# Patient Record
Sex: FEMALE | Race: WHITE | NOT HISPANIC OR LATINO | Employment: OTHER | URBAN - METROPOLITAN AREA
[De-identification: names, ages, dates, MRNs, and addresses within clinical notes are randomized per-mention and may not be internally consistent; named-entity substitution may affect disease eponyms.]

---

## 2023-12-29 ENCOUNTER — OFFICE VISIT (OUTPATIENT)
Dept: OPTOMETRY | Facility: CLINIC | Age: 63
End: 2023-12-29
Payer: MEDICARE

## 2023-12-29 DIAGNOSIS — G43.109 OCULAR MIGRAINE: ICD-10-CM

## 2023-12-29 DIAGNOSIS — H52.03 HYPEROPIA WITH ASTIGMATISM AND PRESBYOPIA, BILATERAL: ICD-10-CM

## 2023-12-29 DIAGNOSIS — H53.022 REFRACTIVE AMBLYOPIA OF LEFT EYE: ICD-10-CM

## 2023-12-29 DIAGNOSIS — H52.203 HYPEROPIA WITH ASTIGMATISM AND PRESBYOPIA, BILATERAL: ICD-10-CM

## 2023-12-29 DIAGNOSIS — H25.13 AGE-RELATED NUCLEAR CATARACT, BILATERAL: Primary | ICD-10-CM

## 2023-12-29 DIAGNOSIS — H52.4 HYPEROPIA WITH ASTIGMATISM AND PRESBYOPIA, BILATERAL: ICD-10-CM

## 2023-12-29 PROCEDURE — 92004 COMPRE OPH EXAM NEW PT 1/>: CPT | Mod: S$GLB,,,

## 2023-12-29 PROCEDURE — 1101F PR PT FALLS ASSESS DOC 0-1 FALLS W/OUT INJ PAST YR: ICD-10-PCS | Mod: CPTII,S$GLB,,

## 2023-12-29 PROCEDURE — 99999 PR PBB SHADOW E&M-NEW PATIENT-LVL II: CPT | Mod: PBBFAC,,,

## 2023-12-29 PROCEDURE — 99999 PR PBB SHADOW E&M-NEW PATIENT-LVL II: ICD-10-PCS | Mod: PBBFAC,,,

## 2023-12-29 PROCEDURE — 1101F PT FALLS ASSESS-DOCD LE1/YR: CPT | Mod: CPTII,S$GLB,,

## 2023-12-29 PROCEDURE — 1159F MED LIST DOCD IN RCRD: CPT | Mod: CPTII,S$GLB,,

## 2023-12-29 PROCEDURE — 3288F FALL RISK ASSESSMENT DOCD: CPT | Mod: CPTII,S$GLB,,

## 2023-12-29 PROCEDURE — 3288F PR FALLS RISK ASSESSMENT DOCUMENTED: ICD-10-PCS | Mod: CPTII,S$GLB,,

## 2023-12-29 PROCEDURE — 92004 PR EYE EXAM, NEW PATIENT,COMPREHESV: ICD-10-PCS | Mod: S$GLB,,,

## 2023-12-29 PROCEDURE — 1159F PR MEDICATION LIST DOCUMENTED IN MEDICAL RECORD: ICD-10-PCS | Mod: CPTII,S$GLB,,

## 2023-12-29 RX ORDER — CALCIUM CARBONATE 300MG(750)
TABLET,CHEWABLE ORAL
COMMUNITY

## 2023-12-30 NOTE — PROGRESS NOTES
HPI    New pt here for annual exam. Last exam- 5-10 yrs ago     Pt sts [facility] OD is great. Amblyopic OS since childhood. Pt has a h/o ocular   migraines. Pt has occasional floaters. Pt denies flashes/pain. Pt using AT   in the am.    Last edited by Nisha Nunez, OD on 12/29/2023  1:29 PM.            Assessment /Plan     For exam results, see Encounter Report.    Age-related nuclear cataract, bilateral    Ocular migraine    Refractive amblyopia of left eye    Hyperopia with astigmatism and presbyopia, bilateral      Very mild, not yet visually significant. Surgery not recommended at this time. Ed pt on symptoms of worsening cataracts including reduced BCVA and glare. Monitor yearly for changes.  Ed pt on benign nature of ocular migraines. Discussed various potential triggers of ocular migraines and recommended pt maintain a log of occurrences. Ed pt that the only treatment is to uncover and manage triggers. Consider neuro referral if occurrences increasing in frequency or intensity. Otherwise, continue to monitor.   Refractive amblyopia OS. Longstanding. Pt correctable to 20/30 with spec rx. Stable. Monitor yearly for changes.  Discussed spec options with pt, pt ok with continuing with OTC readers. Advised +2.75-+3.00.     RTC: 1 year for comprehensive exam or sooner prn